# Patient Record
Sex: FEMALE | Race: WHITE | NOT HISPANIC OR LATINO | Employment: OTHER | URBAN - METROPOLITAN AREA
[De-identification: names, ages, dates, MRNs, and addresses within clinical notes are randomized per-mention and may not be internally consistent; named-entity substitution may affect disease eponyms.]

---

## 2023-05-10 ENCOUNTER — TRANSCRIBE ORDERS (OUTPATIENT)
Dept: PHYSICAL THERAPY | Facility: HOSPITAL | Age: 83
End: 2023-05-10
Payer: MEDICARE

## 2023-05-10 DIAGNOSIS — M51.36 DEGENERATION, INTERVERTEBRAL DISC, LUMBAR: Primary | ICD-10-CM

## 2023-06-15 ENCOUNTER — TREATMENT (OUTPATIENT)
Dept: PHYSICAL THERAPY | Facility: CLINIC | Age: 83
End: 2023-06-15
Payer: MEDICARE

## 2023-06-15 DIAGNOSIS — G89.29 CHRONIC BILATERAL LOW BACK PAIN WITHOUT SCIATICA: Primary | ICD-10-CM

## 2023-06-15 DIAGNOSIS — M54.50 CHRONIC BILATERAL LOW BACK PAIN WITHOUT SCIATICA: Primary | ICD-10-CM

## 2023-06-15 DIAGNOSIS — M54.16 RADICULOPATHY, LUMBAR REGION: ICD-10-CM

## 2023-06-15 PROCEDURE — 97161 PT EVAL LOW COMPLEX 20 MIN: CPT | Performed by: PHYSICAL THERAPIST

## 2023-06-15 PROCEDURE — 97110 THERAPEUTIC EXERCISES: CPT | Performed by: PHYSICAL THERAPIST

## 2023-06-15 NOTE — PROGRESS NOTES
Physical Therapy Initial Evaluation and Plan of Care    [facility] [place] [address]  [phone] (phone)  [phone] (fax)      Patient: Lashawn Wang   : 1940  Diagnosis/ICD-10 Code:  Chronic bilateral low back pain without sciatica [M54.50, G89.29]  Referring practitioner: Eva Coronado MD  Date of Initial Visit: 6/15/2023  Today's Date: 6/15/2023  Patient seen for 1 sessions           Subjective Evaluation    History of Present Illness  Onset date: Chronic.  Mechanism of injury: Chronic back pain that comes and goes.    It is predominately on the right and it makes it difficult to sleep on the right side.  The pain may radiate to the front of the right more than left thighs.  The pain is aggravated by sitting in all types of chairs.   She had history of cervical surgery for pain down her arm that was effective.   She takes over the counter medication as needed for pain.   Physical therapy was effective several years ago.  She plays tennis twice a week. She just came back from trip to [place].   History of lung cancer and she has some shortness of breath at times due to partial lung on the right side.     Pain  Current pain rating: 3  At worst pain ratin  Location: Lower back  Quality: dull ache and radiating  Relieving factors: support  Aggravating factors: prolonged positioning  Progression: no change    Diagnostic Tests  Abnormal x-ray: Lumbar DDD and facet arthrosis.    Patient Goals  Patient goals for therapy: decreased pain           Objective          Static Posture     Comments  Mild forward head  Upper thoracic kyphosis  Decreased lumbar lordosis  Pelvis level      Palpation     Right   Hypertonic in the erector spinae, lumbar paraspinals and quadratus lumborum. Tenderness of the erector spinae, lumbar paraspinals and quadratus lumborum.     Active Range of Motion     Lumbar   Flexion: Active lumbar flexion: 50%   Extension:  Active lumbar extension: 50%   Left lateral flexion: WFL  Right lateral flexion: WFL  Left rotation: WFL  Right rotation: WFL    Strength/Myotome Testing     Left Hip   Planes of Motion   Flexion: 5  Abduction: 5    Right Hip   Planes of Motion   Flexion: 5 (Some pain increased to lower back)  Abduction: 5    Left Knee   Flexion: 5  Extension: 5    Right Knee   Flexion: 5  Extension: 5    Left Ankle/Foot   Dorsiflexion: 5  Plantar flexion: 5    Right Ankle/Foot   Dorsiflexion: 5  Plantar flexion: 5    Additional Strength Details  Transverse abdominus 4-/5  Able to bridge hips 3+/5      Tests     Lumbar     Left   Positive femoral stretch.   Negative passive SLR.     Right   Positive femoral stretch.   Negative passive SLR.     Lumbar Flexibility Comments:   Moderate tightness of hip rotators, hamstrings, quadriceps, calf     Ambulation   Weight-Bearing Status   Assistive device used: none    Observational Gait   Gait: within functional limits       See Exercise, Manual, and Modality Logs for complete treatment.   Performed and issued HEP:  Pelvic tilt x 10 x 5 seconds  Double knee to chest stretch x 30 seconds  Single knee to chest x 30 seconds each  Piriformis stretch with hip rotated in x 30 seconds each  Piriformis stretch with hip rotated out x 30 seconds each  Lower trunk rotation 20 seconds x 3 each side  Hamstrings stretch 90/90 x 30 seconds each  Cat/cow x 5 seconds x 10  Child's pose x 30 seconds  Standing calf stretch x 30 seconds x 2 each  Quad stretch with foot in chair x 30 seconds each  Encourage walking     Functional Outcome Score: Modified Oswestry score is 18% disability     Assessment & Plan     Assessment  Impairments: abnormal muscle tone, abnormal or restricted ROM, activity intolerance, lacks appropriate home exercise program and pain with function  Functional Limitations: uncomfortable because of pain and sitting  Assessment details: Lashawn Wang is an 82 y.o. year-old female referred to  physical therapy for chronic low back pain that may radiate to the front of her thighs. She presents with a stable clinical presentation.  She has comorbidities of lumbar DDD and facet arthrosis and no known personal factors that may affect her progress in the plan of care.  Signs and symptoms are consistent with physical therapy diagnosis of chronic lower back pain with neural tension.  She will benefit from instruction in flexibility exercises.and to engage in more regular walking .   Prognosis: good    Goals  Plan Goals: STGs to be met by 23    STG 1 Patient will demonstrate an independent HEP for back and hip flexibility HEP.    STG 2 Patient will be independent with good back postures and body mechanics to avoid strain to spine with ADLs    STG 3 Patient will report decreased  lower back pain with sitting by 20% or more    LTGs to be achieved by 23    LTG 1 Patient will report no recent episodes of lower back pain above 3/10    LTG 2 Patient will report decreased radiating pain in her thighs by 50%    LTG3 Patient will report decreased functional disability on Modified Oswestry score from 18 % to 10 % or less    Plan  Planned therapy interventions: abdominal trunk stabilization, neuromuscular re-education, manual therapy, flexibility, home exercise program, therapeutic activities, stretching, strengthening and body mechanics training  Frequency: 2x month  Duration in weeks: 12  Treatment plan discussed with: patient  Plan details: May consider foam rolling       Timed:  Manual Therapy:    0     mins  12553;  Therapeutic Exercise:    25     mins  38339;     Neuromuscular Nissa:    0    mins  42130;    Therapeutic Activity:     0     mins  21068;     Gait Trainin     mins  41554;     Ultrasound:     0     mins  68902;    Iontophoresis    0     mins 67352      Untimed:  Electrical Stimulation:    0     mins  62789 ( );  Traction:  0     mins  79549;   Low Eval     25     Mins  25490  Mod  Eval     0     Mins  41181  High Eval                       0     Mins  69379  Dry Needling  (1-2 muscles)            0     mins 90200 (Self-pay)  Dry Needling (3-4 muscles) 0     mins 20561 (Self-pay)  Dry Needling Trial    0     mins DRYNDLTRIAL  (No Charge)      Timed Treatment:   25   mins   Total Treatment:     50   mins    PT SIGNATURE: RENA Villarreal License Number: [id number]    Electronically signed by Susanna Pandey PT, 06/15/23, 8:05 AM EDT    DATE TREATMENT INITIATED: 6/15/2023    Medicare Initial Certification  Certification Period: 9/13/2023  I certify that the therapy services are furnished while this patient is under my care.  The services outlined above are required by this patient, and will be reviewed every 90 days.     PHYSICIAN: Eva Coronado MD   NPI: [id number]                                         DATE:     Please sign and return via fax to [phone] Thank you, [facility].